# Patient Record
(demographics unavailable — no encounter records)

---

## 2025-02-04 NOTE — HISTORY OF PRESENT ILLNESS
[FreeTextEntry1] : 86 year-old male with HTN, HLD, Hypothyroidism, AFIB presents for evaluation of LE edema. Patient reports that after his  travel to [facility] from 1/3/25 - 1/21/25 he has worsening LE edema. Patient denies CP, SOB, palpitations, or lightheadedness. Recent Blood test on 1/24/25 showed TSH of 7.3, ECG showed AFIB, RBB.  Patient has irregular heart rhythm. I advised patient to undergo an echocardiogram.

## 2025-02-24 NOTE — HISTORY OF PRESENT ILLNESS
[FreeTextEntry1] : 2/19/25 - Patient has been stable.  Patient reports improved LE edema.  He is on Lasix 20 mg for LE edema.  He is on Olmesartan 20 mg QD.  He is on Simvastatin 20 mg for HLD.  He is on Levothyroxine 25 mcq for hypothyroidism.  /69 HR 61.  Exam showed irregularly irregular rate and rhythm. 1+ LE edema.  Repeat ProBNP 1488.  I advised patient to start Jardiance 10 mg and continue Lasix 20 mg.  FU 1 month.   2/4/25 - Patient reports that after his  travel to [facility] from 1/3/25 - 1/21/25 he has worsening LE edema. Patient denies CP, SOB, palpitations, or lightheadedness. Recent Blood test on 1/24/25 showed TSH of 7.3, ECG showed AFIB, RBB.  Patient has irregular heart rhythm.  2+ LE edema noted.  I advised patient to undergo an echocardiogram.  Elevated ProBNP 2473.  I advised patient to start Eliquis 2.5 mg BID for stroke prevention and Lasix 20 mg for HFpEF.

## 2025-02-24 NOTE — PHYSICAL EXAM
[Well Developed] : well developed [Well Nourished] : well nourished [No Acute Distress] : no acute distress [Normal Conjunctiva] : normal conjunctiva [Normal Venous Pressure] : normal venous pressure [No Carotid Bruit] : no carotid bruit [Normal S1, S2] : normal S1, S2 [No Murmur] : no murmur [No Rub] : no rub [No Gallop] : no gallop [Clear Lung Fields] : clear lung fields [Good Air Entry] : good air entry [No Respiratory Distress] : no respiratory distress  [Soft] : abdomen soft [Non Tender] : non-tender [No Masses/organomegaly] : no masses/organomegaly [Normal Bowel Sounds] : normal bowel sounds [Normal Gait] : normal gait [No Edema] : no edema [No Cyanosis] : no cyanosis [No Clubbing] : no clubbing [No Varicosities] : no varicosities [Moves all extremities] : moves all extremities [No Focal Deficits] : no focal deficits [Normal Speech] : normal speech [Alert and Oriented] : alert and oriented [Normal memory] : normal memory [de-identified] : irregularly irregular rate and rhythm.  [de-identified] : 1+ LE edema.

## 2025-02-24 NOTE — PHYSICAL EXAM
[Well Developed] : well developed [Well Nourished] : well nourished [No Acute Distress] : no acute distress [Normal Conjunctiva] : normal conjunctiva [Normal Venous Pressure] : normal venous pressure [No Carotid Bruit] : no carotid bruit [Normal S1, S2] : normal S1, S2 [No Murmur] : no murmur [No Rub] : no rub [No Gallop] : no gallop [Clear Lung Fields] : clear lung fields [Good Air Entry] : good air entry [No Respiratory Distress] : no respiratory distress  [Soft] : abdomen soft [Non Tender] : non-tender [No Masses/organomegaly] : no masses/organomegaly [Normal Bowel Sounds] : normal bowel sounds [Normal Gait] : normal gait [No Edema] : no edema [No Cyanosis] : no cyanosis [No Clubbing] : no clubbing [No Varicosities] : no varicosities [Moves all extremities] : moves all extremities [No Focal Deficits] : no focal deficits [Normal Speech] : normal speech [Alert and Oriented] : alert and oriented [Normal memory] : normal memory [de-identified] : irregularly irregular rate and rhythm.  [de-identified] : 1+ LE edema.

## 2025-02-24 NOTE — REASON FOR VISIT
[FreeTextEntry1] : 86 year-old male with AFIB on Eliquis, HTN, HLD, Hypothyroidism, presents for followup.    Patient was previously seen on 2/4/25 for evaluation of LE edema.  Patient reports that after his  travel to [facility] from 1/3/25 - 1/21/25 he has worsening LE edema. Patient denies CP, SOB, palpitations, or lightheadedness. Recent Blood test on 1/24/25 showed TSH of 7.3, ECG showed AFIB, RBB.  Patient has irregular heart rhythm.  2+ LE edema noted.  I advised patient to undergo an echocardiogram.  Elevated ProBNP 2473.  I advised patient to start Eliquis 2.5 mg BID for stroke prevention and Lasix 20 mg for HFpEF.  He is on Lasix 20 mg for LE edema.  He is on Olmesartan 20 mg QD.  She is on Simvastatin 20 mg for HLD.  He is on Levothyroxine 25 mcq for hypothyroidism.

## 2025-02-24 NOTE — HISTORY OF PRESENT ILLNESS
[FreeTextEntry1] : 2/19/25 - Patient has been stable.  Patient reports improved LE edema.  He is on Lasix 20 mg for LE edema.  He is on Olmesartan 20 mg QD.  He is on Simvastatin 20 mg for HLD.  He is on Levothyroxine 25 mcq for hypothyroidism.  /69 HR 61.  Exam showed irregularly irregular rate and rhythm. 1+ LE edema.  Repeat ProBNP 1488.  I advised patient to start Jardiance 10 mg and continue Lasix 20 mg.  FU 1 month.   2/4/25 - Patient reports that after his  travel to [facility] from 1/3/25 - 1/21/25 he has worsening LE edema. Patient denies CP, SOB, palpitations, or lightheadedness. Recent Blood test on 1/24/25 showed TSH of 7.3, ECG showed AFIB, RBB.  Patient has irregular heart rhythm.  2+ LE edema noted.  I advised patient to undergo an echocardiogram.  Elevated ProBNP 2473.  I advised patient to start Eliquis 2.5 mg BID for stroke prevention and Lasix 20 mg for HFpEF.

## 2025-02-24 NOTE — REASON FOR VISIT
[FreeTextEntry1] : 86 year-old male with AFIB on Eliquis, HTN, HLD, Hypothyroidism, presents for followup.    Patient was previously seen on 2/4/25 for evaluation of LE edema.  Patient reports that after his  travel to [facility] from 1/3/25 - 1/21/25 he has worsening LE edema. Patient denies CP, SOB, palpitations, or lightheadedness. Recent Blood test on 1/24/25 showed TSH of 7.3, ECG showed AFIB, RBB.  Patient has irregular heart rhythm.  2+ LE edema noted.  I advised patient to undergo an echocardiogram.  Elevated ProBNP 2473.  I advised patient to start Eliquis 2.5 mg BID for stroke prevention and Lasix 20 mg for HFpEF.  He is on Lasix 20 mg for LE edema.  He is on Olmesartan 20 mg QD.  She is on Simvastatin 20 mg for HLD.  He is on Levothyroxine 25 mcq for hypothyroidism.